# Patient Record
Sex: FEMALE | Race: WHITE
[De-identification: names, ages, dates, MRNs, and addresses within clinical notes are randomized per-mention and may not be internally consistent; named-entity substitution may affect disease eponyms.]

---

## 2017-01-04 ENCOUNTER — MESSAGE (OUTPATIENT)
Age: 32
End: 2017-01-04

## 2017-01-05 ENCOUNTER — OTHER (OUTPATIENT)
Age: 32
End: 2017-01-05

## 2017-01-06 ENCOUNTER — APPOINTMENT (OUTPATIENT)
Dept: OBGYN | Facility: CLINIC | Age: 32
End: 2017-01-06

## 2017-01-18 ENCOUNTER — APPOINTMENT (OUTPATIENT)
Dept: OBGYN | Facility: CLINIC | Age: 32
End: 2017-01-18

## 2017-02-04 ENCOUNTER — FORM ENCOUNTER (OUTPATIENT)
Age: 32
End: 2017-02-04

## 2017-02-05 ENCOUNTER — OUTPATIENT (OUTPATIENT)
Dept: OUTPATIENT SERVICES | Facility: HOSPITAL | Age: 32
LOS: 1 days | End: 2017-02-05
Payer: COMMERCIAL

## 2017-02-05 ENCOUNTER — APPOINTMENT (OUTPATIENT)
Dept: MRI IMAGING | Facility: CLINIC | Age: 32
End: 2017-02-05

## 2017-02-05 DIAGNOSIS — M54.16 RADICULOPATHY, LUMBAR REGION: ICD-10-CM

## 2017-02-05 PROCEDURE — 72148 MRI LUMBAR SPINE W/O DYE: CPT

## 2017-04-24 ENCOUNTER — TRANSCRIPTION ENCOUNTER (OUTPATIENT)
Age: 32
End: 2017-04-24

## 2017-05-10 ENCOUNTER — OTHER (OUTPATIENT)
Age: 32
End: 2017-05-10

## 2017-06-07 ENCOUNTER — APPOINTMENT (OUTPATIENT)
Dept: OBGYN | Facility: CLINIC | Age: 32
End: 2017-06-07

## 2017-06-07 DIAGNOSIS — A63.0 ANOGENITAL (VENEREAL) WARTS: ICD-10-CM

## 2017-06-08 ENCOUNTER — MOBILE ON CALL (OUTPATIENT)
Age: 32
End: 2017-06-08

## 2017-06-12 ENCOUNTER — CLINICAL ADVICE (OUTPATIENT)
Age: 32
End: 2017-06-12

## 2017-06-12 LAB
ACTINOMYCES CULTURE FOR IUD: NORMAL
CORE LAB BIOPSY: NORMAL
CYTOLOGY CVX/VAG DOC THIN PREP: NORMAL
HPV HIGH+LOW RISK DNA PNL CVX: POSITIVE

## 2017-07-03 ENCOUNTER — APPOINTMENT (OUTPATIENT)
Dept: INTERNAL MEDICINE | Facility: CLINIC | Age: 32
End: 2017-07-03

## 2017-07-21 ENCOUNTER — APPOINTMENT (OUTPATIENT)
Dept: INTERNAL MEDICINE | Facility: CLINIC | Age: 32
End: 2017-07-21

## 2017-07-21 ENCOUNTER — NON-APPOINTMENT (OUTPATIENT)
Age: 32
End: 2017-07-21

## 2017-07-21 VITALS
TEMPERATURE: 98.7 F | OXYGEN SATURATION: 98 % | HEIGHT: 66.5 IN | HEART RATE: 71 BPM | BODY MASS INDEX: 22.39 KG/M2 | WEIGHT: 141 LBS | DIASTOLIC BLOOD PRESSURE: 70 MMHG | SYSTOLIC BLOOD PRESSURE: 118 MMHG

## 2017-07-21 DIAGNOSIS — Z30.430 ENCOUNTER FOR INSERTION OF INTRAUTERINE CONTRACEPTIVE DEVICE: ICD-10-CM

## 2017-07-21 DIAGNOSIS — Z00.00 ENCOUNTER FOR GENERAL ADULT MEDICAL EXAMINATION W/OUT ABNORMAL FINDINGS: ICD-10-CM

## 2017-07-21 DIAGNOSIS — Z56.0 UNEMPLOYMENT, UNSPECIFIED: ICD-10-CM

## 2017-07-21 DIAGNOSIS — M25.512 PAIN IN LEFT SHOULDER: ICD-10-CM

## 2017-07-21 DIAGNOSIS — Z30.432 ENCOUNTER FOR REMOVAL OF INTRAUTERINE CONTRACEPTIVE DEVICE: ICD-10-CM

## 2017-07-21 RX ORDER — SERTRALINE HYDROCHLORIDE 50 MG/1
50 TABLET, FILM COATED ORAL DAILY
Qty: 45 | Refills: 3 | Status: ACTIVE | COMMUNITY

## 2017-07-21 SDOH — ECONOMIC STABILITY - INCOME SECURITY: UNEMPLOYMENT, UNSPECIFIED: Z56.0

## 2017-07-24 DIAGNOSIS — D72.829 ELEVATED WHITE BLOOD CELL COUNT, UNSPECIFIED: ICD-10-CM

## 2017-07-24 LAB
25(OH)D3 SERPL-MCNC: 28.4 NG/ML
ALBUMIN SERPL ELPH-MCNC: 4.5 G/DL
ALP BLD-CCNC: 86 U/L
ALT SERPL-CCNC: 13 U/L
ANION GAP SERPL CALC-SCNC: 16 MMOL/L
APPEARANCE: CLEAR
AST SERPL-CCNC: 18 U/L
BASOPHILS # BLD AUTO: 0.03 K/UL
BASOPHILS NFR BLD AUTO: 0.2 %
BILIRUB SERPL-MCNC: 0.3 MG/DL
BILIRUBIN URINE: NEGATIVE
BLOOD URINE: NEGATIVE
BUN SERPL-MCNC: 8 MG/DL
CALCIUM SERPL-MCNC: 10 MG/DL
CHLORIDE SERPL-SCNC: 97 MMOL/L
CHOLEST SERPL-MCNC: 230 MG/DL
CHOLEST/HDLC SERPL: 2.8 RATIO
CO2 SERPL-SCNC: 25 MMOL/L
COLOR: YELLOW
CREAT SERPL-MCNC: 0.66 MG/DL
EOSINOPHIL # BLD AUTO: 0.01 K/UL
EOSINOPHIL NFR BLD AUTO: 0.1 %
GLUCOSE QUALITATIVE U: NORMAL MG/DL
GLUCOSE SERPL-MCNC: 78 MG/DL
HBA1C MFR BLD HPLC: 5 %
HCT VFR BLD CALC: 37.3 %
HDLC SERPL-MCNC: 83 MG/DL
HGB BLD-MCNC: 12.1 G/DL
IMM GRANULOCYTES NFR BLD AUTO: 0.3 %
KETONES URINE: NEGATIVE
LDLC SERPL CALC-MCNC: 132 MG/DL
LEUKOCYTE ESTERASE URINE: NEGATIVE
LYMPHOCYTES # BLD AUTO: 1.84 K/UL
LYMPHOCYTES NFR BLD AUTO: 11.9 %
MAN DIFF?: NORMAL
MCHC RBC-ENTMCNC: 30.3 PG
MCHC RBC-ENTMCNC: 32.4 GM/DL
MCV RBC AUTO: 93.3 FL
MONOCYTES # BLD AUTO: 0.75 K/UL
MONOCYTES NFR BLD AUTO: 4.8 %
NEUTROPHILS # BLD AUTO: 12.83 K/UL
NEUTROPHILS NFR BLD AUTO: 82.7 %
NITRITE URINE: NEGATIVE
PH URINE: 6.5
PLATELET # BLD AUTO: 281 K/UL
POTASSIUM SERPL-SCNC: 4 MMOL/L
PROT SERPL-MCNC: 7.4 G/DL
PROTEIN URINE: NEGATIVE MG/DL
RBC # BLD: 4 M/UL
RBC # FLD: 13.6 %
SODIUM SERPL-SCNC: 138 MMOL/L
SPECIFIC GRAVITY URINE: 1.01
TRIGL SERPL-MCNC: 73 MG/DL
TSH SERPL-ACNC: 0.36 UIU/ML
UROBILINOGEN URINE: NORMAL MG/DL
WBC # FLD AUTO: 15.51 K/UL

## 2017-08-15 ENCOUNTER — APPOINTMENT (OUTPATIENT)
Dept: OBGYN | Facility: CLINIC | Age: 32
End: 2017-08-15
Payer: COMMERCIAL

## 2017-08-15 ENCOUNTER — ASOB RESULT (OUTPATIENT)
Age: 32
End: 2017-08-15

## 2017-08-15 ENCOUNTER — CLINICAL ADVICE (OUTPATIENT)
Age: 32
End: 2017-08-15

## 2017-08-15 PROCEDURE — 76830 TRANSVAGINAL US NON-OB: CPT

## 2017-08-29 ENCOUNTER — OTHER (OUTPATIENT)
Age: 32
End: 2017-08-29

## 2017-09-18 ENCOUNTER — APPOINTMENT (OUTPATIENT)
Dept: OBGYN | Facility: CLINIC | Age: 32
End: 2017-09-18
Payer: COMMERCIAL

## 2017-09-18 VITALS
HEIGHT: 67 IN | WEIGHT: 145 LBS | SYSTOLIC BLOOD PRESSURE: 110 MMHG | BODY MASS INDEX: 22.76 KG/M2 | DIASTOLIC BLOOD PRESSURE: 76 MMHG

## 2017-09-18 DIAGNOSIS — N93.0 POSTCOITAL AND CONTACT BLEEDING: ICD-10-CM

## 2017-09-18 PROCEDURE — 99212 OFFICE O/P EST SF 10 MIN: CPT

## 2017-09-19 ENCOUNTER — MESSAGE (OUTPATIENT)
Age: 32
End: 2017-09-19

## 2017-09-19 LAB
C TRACH RRNA SPEC QL NAA+PROBE: NORMAL
CANDIDA VAG CYTO: NOT DETECTED
G VAGINALIS+PREV SP MTYP VAG QL MICRO: NOT DETECTED
HPV HIGH+LOW RISK DNA PNL CVX: NEGATIVE
N GONORRHOEA RRNA SPEC QL NAA+PROBE: NORMAL
SOURCE AMPLIFICATION: NORMAL
T VAGINALIS VAG QL WET PREP: NOT DETECTED

## 2017-09-23 ENCOUNTER — MOBILE ON CALL (OUTPATIENT)
Age: 32
End: 2017-09-23

## 2017-09-24 ENCOUNTER — MESSAGE (OUTPATIENT)
Age: 32
End: 2017-09-24

## 2017-09-24 LAB — CYTOLOGY CVX/VAG DOC THIN PREP: NORMAL

## 2017-09-25 ENCOUNTER — CLINICAL ADVICE (OUTPATIENT)
Age: 32
End: 2017-09-25

## 2017-10-13 ENCOUNTER — APPOINTMENT (OUTPATIENT)
Dept: OBGYN | Facility: CLINIC | Age: 32
End: 2017-10-13

## 2017-10-18 ENCOUNTER — APPOINTMENT (OUTPATIENT)
Dept: OBGYN | Facility: CLINIC | Age: 32
End: 2017-10-18
Payer: COMMERCIAL

## 2017-10-18 ENCOUNTER — ASOB RESULT (OUTPATIENT)
Age: 32
End: 2017-10-18

## 2017-10-18 PROCEDURE — 76830 TRANSVAGINAL US NON-OB: CPT

## 2017-10-19 ENCOUNTER — TRANSCRIPTION ENCOUNTER (OUTPATIENT)
Age: 32
End: 2017-10-19

## 2017-11-01 ENCOUNTER — APPOINTMENT (OUTPATIENT)
Dept: OBGYN | Facility: CLINIC | Age: 32
End: 2017-11-01

## 2017-11-13 ENCOUNTER — TRANSCRIPTION ENCOUNTER (OUTPATIENT)
Age: 32
End: 2017-11-13

## 2017-11-14 ENCOUNTER — RESULT REVIEW (OUTPATIENT)
Age: 32
End: 2017-11-14

## 2017-12-14 ENCOUNTER — APPOINTMENT (OUTPATIENT)
Dept: INTERNAL MEDICINE | Facility: CLINIC | Age: 32
End: 2017-12-14
Payer: COMMERCIAL

## 2017-12-14 VITALS
TEMPERATURE: 98.6 F | SYSTOLIC BLOOD PRESSURE: 116 MMHG | BODY MASS INDEX: 22.6 KG/M2 | OXYGEN SATURATION: 98 % | HEART RATE: 96 BPM | HEIGHT: 67 IN | DIASTOLIC BLOOD PRESSURE: 76 MMHG | WEIGHT: 144 LBS

## 2017-12-14 DIAGNOSIS — M54.16 RADICULOPATHY, LUMBAR REGION: ICD-10-CM

## 2017-12-14 DIAGNOSIS — Z86.69 PERSONAL HISTORY OF OTHER DISEASES OF THE NERVOUS SYSTEM AND SENSE ORGANS: ICD-10-CM

## 2017-12-14 DIAGNOSIS — F41.8 OTHER SPECIFIED ANXIETY DISORDERS: ICD-10-CM

## 2017-12-14 DIAGNOSIS — Z83.3 FAMILY HISTORY OF DIABETES MELLITUS: ICD-10-CM

## 2017-12-14 DIAGNOSIS — Z87.42 PERSONAL HISTORY OF OTHER DISEASES OF THE FEMALE GENITAL TRACT: ICD-10-CM

## 2017-12-14 DIAGNOSIS — Z82.61 FAMILY HISTORY OF ARTHRITIS: ICD-10-CM

## 2017-12-14 DIAGNOSIS — Z86.59 PERSONAL HISTORY OF OTHER MENTAL AND BEHAVIORAL DISORDERS: ICD-10-CM

## 2017-12-14 DIAGNOSIS — Z81.8 FAMILY HISTORY OF OTHER MENTAL AND BEHAVIORAL DISORDERS: ICD-10-CM

## 2017-12-14 DIAGNOSIS — Z87.898 PERSONAL HISTORY OF OTHER SPECIFIED CONDITIONS: ICD-10-CM

## 2017-12-14 DIAGNOSIS — Z82.62 FAMILY HISTORY OF OSTEOPOROSIS: ICD-10-CM

## 2017-12-14 DIAGNOSIS — M54.9 DORSALGIA, UNSPECIFIED: ICD-10-CM

## 2017-12-14 DIAGNOSIS — Z78.9 OTHER SPECIFIED HEALTH STATUS: ICD-10-CM

## 2017-12-14 DIAGNOSIS — Z80.3 FAMILY HISTORY OF MALIGNANT NEOPLASM OF BREAST: ICD-10-CM

## 2017-12-14 PROCEDURE — 99214 OFFICE O/P EST MOD 30 MIN: CPT

## 2017-12-14 RX ORDER — GABAPENTIN 100 MG/1
100 CAPSULE ORAL
Qty: 90 | Refills: 0 | Status: ACTIVE | COMMUNITY
Start: 2017-12-14

## 2018-01-20 ENCOUNTER — TRANSCRIPTION ENCOUNTER (OUTPATIENT)
Age: 33
End: 2018-01-20

## 2018-01-23 ENCOUNTER — APPOINTMENT (OUTPATIENT)
Age: 33
End: 2018-01-23
Payer: COMMERCIAL

## 2018-01-23 ENCOUNTER — RESULT CHARGE (OUTPATIENT)
Age: 33
End: 2018-01-23

## 2018-01-23 VITALS
HEART RATE: 87 BPM | OXYGEN SATURATION: 97 % | DIASTOLIC BLOOD PRESSURE: 70 MMHG | BODY MASS INDEX: 22.6 KG/M2 | WEIGHT: 144 LBS | TEMPERATURE: 99.2 F | HEIGHT: 67 IN | SYSTOLIC BLOOD PRESSURE: 110 MMHG

## 2018-01-23 DIAGNOSIS — J06.9 ACUTE UPPER RESPIRATORY INFECTION, UNSPECIFIED: ICD-10-CM

## 2018-01-23 PROCEDURE — 87804 INFLUENZA ASSAY W/OPTIC: CPT | Mod: QW

## 2018-01-23 PROCEDURE — 99213 OFFICE O/P EST LOW 20 MIN: CPT | Mod: 25

## 2018-01-23 PROCEDURE — 87880 STREP A ASSAY W/OPTIC: CPT | Mod: QW

## 2018-01-24 ENCOUNTER — RESULT CHARGE (OUTPATIENT)
Age: 33
End: 2018-01-24

## 2018-01-24 LAB — RAPID RVP RESULT: NOT DETECTED

## 2018-01-29 LAB — BACTERIA THROAT CULT: NORMAL

## 2018-05-11 ENCOUNTER — RESULT REVIEW (OUTPATIENT)
Age: 33
End: 2018-05-11

## 2018-08-07 ENCOUNTER — HOSPITAL ENCOUNTER (OUTPATIENT)
Dept: HOSPITAL 14 - H.OPSURG | Age: 33
Setting detail: OBSERVATION
LOS: 1 days | Discharge: HOME | End: 2018-08-08
Attending: OBSTETRICS & GYNECOLOGY | Admitting: OBSTETRICS & GYNECOLOGY
Payer: COMMERCIAL

## 2018-08-07 VITALS — BODY MASS INDEX: 21.9 KG/M2

## 2018-08-07 DIAGNOSIS — N93.9: ICD-10-CM

## 2018-08-07 DIAGNOSIS — N80.3: ICD-10-CM

## 2018-08-07 DIAGNOSIS — N13.4: ICD-10-CM

## 2018-08-07 DIAGNOSIS — N80.4: ICD-10-CM

## 2018-08-07 DIAGNOSIS — N13.5: ICD-10-CM

## 2018-08-07 DIAGNOSIS — N73.6: ICD-10-CM

## 2018-08-07 DIAGNOSIS — N80.8: ICD-10-CM

## 2018-08-07 DIAGNOSIS — N80.1: Primary | ICD-10-CM

## 2018-08-07 DIAGNOSIS — Q51.2: ICD-10-CM

## 2018-08-07 DIAGNOSIS — N80.5: ICD-10-CM

## 2018-08-07 DIAGNOSIS — N80.0: ICD-10-CM

## 2018-08-07 DIAGNOSIS — N70.11: ICD-10-CM

## 2018-08-07 LAB
BASOPHILS # BLD AUTO: 0.1 K/UL (ref 0–0.2)
BASOPHILS NFR BLD: 0.7 % (ref 0–2)
EOSINOPHIL # BLD AUTO: 0.1 K/UL (ref 0–0.7)
EOSINOPHIL NFR BLD: 1.6 % (ref 0–4)
ERYTHROCYTE [DISTWIDTH] IN BLOOD BY AUTOMATED COUNT: 13.4 % (ref 11.5–14.5)
HGB BLD-MCNC: 12.4 G/DL (ref 12–16)
LYMPHOCYTES # BLD AUTO: 1.9 K/UL (ref 1–4.3)
LYMPHOCYTES NFR BLD AUTO: 22.8 % (ref 20–40)
MCH RBC QN AUTO: 30.7 PG (ref 27–31)
MCHC RBC AUTO-ENTMCNC: 34.3 G/DL (ref 33–37)
MCV RBC AUTO: 89.6 FL (ref 81–99)
MONOCYTES # BLD: 0.5 K/UL (ref 0–0.8)
MONOCYTES NFR BLD: 6.6 % (ref 0–10)
NEUTROPHILS # BLD: 5.6 K/UL (ref 1.8–7)
NEUTROPHILS NFR BLD AUTO: 68.3 % (ref 50–75)
NRBC BLD AUTO-RTO: 0.1 % (ref 0–0)
PLATELET # BLD: 232 K/UL (ref 130–400)
PMV BLD AUTO: 8.6 FL (ref 7.2–11.7)
RBC # BLD AUTO: 4.04 MIL/UL (ref 3.8–5.2)
WBC # BLD AUTO: 8.3 K/UL (ref 4.8–10.8)

## 2018-08-07 PROCEDURE — 53899 UNLISTED PX URINARY SYSTEM: CPT

## 2018-08-07 PROCEDURE — 88305 TISSUE EXAM BY PATHOLOGIST: CPT

## 2018-08-07 PROCEDURE — 85025 COMPLETE CBC W/AUTO DIFF WBC: CPT

## 2018-08-07 PROCEDURE — 58560 HYSTEROSCOPY RESECT SEPTUM: CPT

## 2018-08-07 PROCEDURE — 88304 TISSUE EXAM BY PATHOLOGIST: CPT

## 2018-08-07 PROCEDURE — 36415 COLL VENOUS BLD VENIPUNCTURE: CPT

## 2018-08-07 PROCEDURE — 86900 BLOOD TYPING SEROLOGIC ABO: CPT

## 2018-08-07 PROCEDURE — 49203: CPT

## 2018-08-07 PROCEDURE — 86850 RBC ANTIBODY SCREEN: CPT

## 2018-08-07 PROCEDURE — 58999 UNLISTED PX FML GENITAL SYS: CPT

## 2018-08-07 PROCEDURE — 44111 EXCISION OF BOWEL LESION(S): CPT

## 2018-08-07 PROCEDURE — 44955 APPENDECTOMY ADD-ON: CPT

## 2018-08-07 PROCEDURE — 45171 EXC RECT TUM TRANSANAL PART: CPT

## 2018-08-07 RX ADMIN — HYDROMORPHONE HYDROCHLORIDE PRN MG: 1 INJECTION, SOLUTION INTRAMUSCULAR; INTRAVENOUS; SUBCUTANEOUS at 15:20

## 2018-08-07 RX ADMIN — HYDROMORPHONE HYDROCHLORIDE PRN MG: 1 INJECTION, SOLUTION INTRAMUSCULAR; INTRAVENOUS; SUBCUTANEOUS at 15:35

## 2018-08-07 RX ADMIN — Medication PRN MG: at 19:11

## 2018-08-07 NOTE — PCM.SURG1
Surgeon's Initial Post Op Note





- Surgeon's Notes


Surgeon: Amaris


Assistant: Burgess Roman MD PGY4


Type of Anesthesia: General Endo, Local


Pre-Operative Diagnosis: Endometriosis


Operative Findings: see op note


Post-Operative Diagnosis: Severe endometriosis


Operation Performed: Robotic assisted endometrectomy; removal of endometrioma, 

appendectomy


Specimen/Specimens Removed: see op note, multiple specimens


Estimated Blood Loss: EBL {In ML}: 15


Blood Products Given: N/A


Drains Used: No Drains


Post-Op Condition: Good


Date of Surgery/Procedure: 08/07/18


Time of Surgery/Procedure: 12:00

## 2018-08-08 VITALS — OXYGEN SATURATION: 99 % | TEMPERATURE: 98.2 F

## 2018-08-08 VITALS — SYSTOLIC BLOOD PRESSURE: 105 MMHG | HEART RATE: 78 BPM | DIASTOLIC BLOOD PRESSURE: 64 MMHG | RESPIRATION RATE: 18 BRPM

## 2018-08-08 RX ADMIN — Medication PRN MG: at 03:07

## 2018-08-08 NOTE — CP.PCM.PN
Subjective





- Date & Time of Evaluation


Date of Evaluation: 08/08/18


Time of Evaluation: 10:20





- Subjective


Subjective: 


Gyn Surgery


Pt seen and examined. Had one episode of emesis last night but now tolerating 

minimal liquids. Reports feeling weak from not eating but does not have much 

appetite. Voiding, Ambulating, no flatus/BM. No other complaints





Objective





- Vital Signs/Intake and Output


Vital Signs (last 24 hours): 


 











Temp Pulse Resp BP Pulse Ox


 


 98.2 F   79   16   102/64   99 


 


 08/08/18 08:32  08/08/18 08:32  08/08/18 08:32  08/08/18 08:32  08/08/18 08:32








Intake and Output: 


 











 08/08/18 08/08/18





 06:59 18:59


 


Intake Total 2000 


 


Balance 2000 














- Medications


Medications: 


 Current Medications





Acetaminophen (Tylenol 325mg Tab)  650 mg PO Q6 [facility]


   Last Admin: 08/07/18 22:09 Dose:  650 mg


Bisacodyl (Dulcolax)  5 mg PO ONCE ONE


   Stop: 08/08/18 10:48


Docusate Sodium (Colace)  100 mg PO BID [facility]


Lactated Ringer's (Lactated Ringer's)  1,000 mls @ 125 mls/hr IV .Q8H [facility]


   Last Admin: 08/07/18 21:03 Dose:  125 mls/hr


Ibuprofen (Motrin Tab)  600 mg PO Q8 PRN


   PRN Reason: Pain, Mild (1-3)


   Last Admin: 08/08/18 02:07 Dose:  600 mg


Ondansetron HCl (Zofran Inj)  4 mg IVP Q6 PRN


   PRN Reason: Nausea/Vomiting


   Last Admin: 08/07/18 22:04 Dose:  4 mg


Oxycodone HCl (Oxycodone Immediate Release Tab)  5 mg PO Q6 PRN


   PRN Reason: Pain, moderate (4-7)


   Last Admin: 08/08/18 03:07 Dose:  5 mg











- Labs


Labs: 


 





 08/07/18 09:31 











- Constitutional


Appears: Non-toxic, No Acute Distress





- Head Exam


Head Exam: ATRAUMATIC, NORMOCEPHALIC





- Eye Exam


Eye Exam: EOMI.  absent: Scleral icterus





- Respiratory Exam


Respiratory Exam: NORMAL BREATHING PATTERN.  absent: Respiratory Distress





- Cardiovascular Exam


Cardiovascular Exam: RRR, +S1, +S2





- GI/Abdominal Exam


GI & Abdominal Exam: Soft, Tenderness (minimal at incisions).  absent: Distended

, Firm, Guarding, Rigid, Rebound


Additional comments: 


dressings c/d/i





- Extremities Exam


Extremities Exam: absent: Calf Tenderness, Pedal Edema





- Neurological Exam


Neurological Exam: Alert, Awake, Oriented x3





- Skin


Skin Exam: Dry, Warm





Assessment and Plan





- Assessment and Plan (Free Text)


Assessment: 


33F s/p  Robotic assisted endometrectomy; removal of endometrioma, appendectomy 

POD#1





Plan: 


Diaz out this AM


Advanced to Regular diet


Stool softener


Possible DC later today if doing well.


D/W Dr. Amaris Schwarz PGY4

## 2018-08-09 NOTE — PCM.OP
Operative Report





- Operative Report


Date of Surgery/Procedure: 08/07/18


Time of Surgery/Procedure: 12:00


Surgeon: Dr. Moreno Roman


Assistant: Dr. Lazaro Glez


Anesthesia/Sedation: general/Dr. Aldana


Pre-Operative Diagnosis: abdominal pina and endometriosis


Post-Operative Diagnosis: multiple areas of sigmid colon invlvement and appendix


Indication for Surgery: as above


Operative Findings: as above


Procedure/Operation Description: 1-Excision perirectal and sigmoid 

endometriosis (perirectal x1, sigmoid x2).  2-Appendectomy.  Brief History: 

This 33 year old woman with severe abdominal pain from endometriosis was 

already brought to the operating room by Dr. Taylor when he requested an 

intraoperatie general surfgery consultation.  Description of the Procedure: The 

robotic procedure was already initiated by Dr. Glez (separate dictation). 

After beltran control of the robotic console the emilee lesion in the sigm oid 

colon was incised circumferentially and with blunt and sharp dissection with 

the aid of electrocautery the lesion was excided en-bloc, mared and sent to 

pathology separately. The second sigmoid lesion was excised in a similar 

fashion. Both defects were closed with interrupted 3-0 vicryl. The rectal 

lesion was excised in a similar fashion and sent separately to pathology. The 

appendix was retracted anteriorly and the mesentery was dessicated with 

monopolar to the base. With three separate 3-0 vicryl endoloops the appendix ws 

ligated, transected, mared and sent to pathology separately. The operation was 

then turned ocer to Dr. Merida ()separate dictation Dr. Glez).


Estimated Blood Loss: 10 cc


Complications: none


Specimen: sigmoid colon (times two) and rectum


Discharge & Condition: stable

## 2018-08-13 NOTE — OP
PROCEDURE DATE:  08/07/2018



SURGEON: Lazaro Glez MD



ASSISTANT: 1. Moreno Roman MD 2. Jorge Schwarz DO



ANESTHESIOLOGIST: Amada Aldana MD



ANESTHETIC: General Endo



PREOPERATIVE DIAGNOSES:

1.  Incapacitating pelvic pain.

2.  Incapacitating abdominal pain.

3.  Abnormal uterine bleeding.

4.  History of pelvic endometriosis.

5.  History of previous failed medical surgical therapy.

6.  History of severe endometriosis and pelvic adhesions.

7.  Gastrointestinal and genitourinary symptoms.

8.  Rule out interstitial cystitis.

9.  Adenomyosis.



POSTOPERATIVE DIAGNOSES:

1.  Incapacitating pelvic pain.

2.  Incapacitating abdominal pain.

3.  Abnormal uterine bleeding.

4.  History of pelvic endometriosis.

5.  History of previous failed medical surgical therapy.

6.  History of severe endometriosis and pelvic adhesions.

7.  Gastrointestinal and genitourinary symptoms.

8.  Rule out interstitial cystitis.

9.  Adenomyosis.

10.  Severe pelvic endometriosis.

11.  Ovarian adhesions.

12.  Bowel adhesions.

13.  Subseptate uterus.

14.  Mild bilateral hydroureters.



OPERATION PERFORMED:

1.  Examination under anesthesia.

2.  Video_assisted hysteroscopy.

3.  Hysteroscopic septoplasty.

4.  Robotic da Faisal laparoscopy.

5.  Enterolysis.

6.  Bilateral ureterolysis.

7.  Bilateral salpingo_ovariolytis.

8.  Multiple peritoneal biopsies and excision of endometriosis.

9.  Treatment of endometriosis.

10.  Shaving of endometriosis of the bowel.

11.  Cystoscopy.

12.  Bilateral ureteral catheterization and injection of IC-Green dye.



Dr. Roman from General Surgery was consulted to perform 1-Excision perirectal 
and sigmoid endometriosis (perirectal x1, sigmoid x2). 

2-Appendectomy procedure and he will dictate that separately.



COMPLICATIONS:  None.



SAMPLES:  



DRAINS:  



ESTIMATED BLOOD LOSS:  Minimal.



HISTORY: MD PROVIDES HISTORY



FINDINGS:  



Genitalia:  normal, external genitalia, cervix normal without lesions or 
polyps.  



Hysteroscopy showed evidence of a small septum of the fundus of the uterus and 
no other lesions visualized.  



Cystoscopy was performed to rule out endometriosis of bladder mucosa and also 
interstitial cystitis, also  injury.  The bladder was normal with no evidence 
of stone, trigonitis or cystitis.  Positive jet flow visualized in both 
ureters.  



Laparoscopy was normal, gallbladder was normal, liver edges appeared to be 
normal.  Ascending colon and transverse were normal.  The appendix appeared to 
be abnormal with both fibrosis and thickening.  There was evidence of severe 
adhesions, fibrosis and endometriosis of the rectovaginal septum and attachment 
of the bowel to the posterior aspect of the uterus and to both the right and 
left adnexa.  Both ovaries were severely attached to the posterior aspect of 
the ureters with endometriosis and adhesions.  Fallopian tubes appeared to have 
some inflammatory changes of adhesions, but overall appeared to be normal.  
Both ovaries appeared to be involved with scar.  There was also evidence of 
endometriosis of the rectovaginal septum in right and left perirectal areas and 
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  There 
was also evidence of severe retroperitoneal fibrosis in this area.  There was 
also evidence of mild bilateral hydroureters.



CONSENT:  The patient had been thoroughly evaluated and counseled regarding 
pros and cons of the procedure, the reasonable alternative, and the possible 
complications.  She understood and accepted the risks involved.  Appropriate 
literature was provided to the patient.  The patient was in understanding that 
given her history and presurgical exam, she knows that she was a high risk and 
average patient.  She accepted all the risks involved and all the questions had 
been answered to her satisfaction.



DESCRIPTION OF PROCEDURE:  



Initiation of the case:  After adequate anesthesia was obtained, the patient 
was placed in the dorsal lithotomy position with extreme care of placement of 
the patient without hyperextension or hyperflexing the hips.  At this point, 
the patient was prepped and draped, the surgeon was gowned and gloved.  A time-
out was taken according to the hospital procedure and the procedure was 
started.  At this point, we performed the cystoscopy and bilateral ureteral 
catheterization. 



At this point we performed cystoscopy:

A cystoscope was inserted into the bladder, under direct visualization and the 
bladder was visualized.  The bladder was free of lesions, tumors.  There was no 
evidence of interstitial cystitis, and there was only a mild amount of 
trigonitis.  At this point, both ureters were identified and appeared to be in 
normal anatomical position.  At this point, utilizing an open-ended 5-Anguillan 
catheter, the left ureter was catheterized all the way to the distal ureter, 
and a 5 mL of IC-Green were injected into the distal ureter.  Similarly, on the 
contralateral ureter, the ureter was catheterized all the way to the distal 
ureter, and a 5 mL of IC-Green were injected into the distal ureter.  At this 
point, the stents were removed, and the hysteroscope was removed and a 16-
Anguillan Diaz was placed into the bladder.  



At this point, we proceeded with a hysteroscopy:

A speculum was placed in the vagina, and the anterior lip of the cervix was 
grasped. The cervix was dilated and a hysteroscope was inserted into the 
cavity. The cavity appeared to be of normal size, but there was evidence of a 
subseptum at the top of the uterus.  At this point, we proceeded with excision 
of uterine septum.  Once the septum was identified, the endoscopic scissors 
were inserted into the uterine cavity and a progressive dissection of the 
septum was performed with great care not to perforate the uterus and not to 
cause any bleeding.  The procedure was basically bloodless and the septum was 
excised.  



At this point, we proceeded with placement of trocars and docking of the Da 
Faisal Xi robot

 The surgeons were re-gowned and re-gloved, and an open laparoscopy was 
performed by making an incision below the umbilicus, and the fascia was incised
, and the peritoneum was entered in the blunt fashion.  The cannula was 
inserted and the abdomen was insufflated, and under direct visualization 3 
additional ports were inserted, left upper quadrant, left mid quadrant and 
right upper quadrant.  At this point, the da Faisal Xi robot was brought into 
the field and docked, and the instruments were inserted under direct 
visualization.  With extreme care not to injure the bowel or any other area.  
As per the dictation, the upper abdomen appeared to be normal with no evidence 
of any lesions.  The pelvis had the findings described above, which included 
significant adhesions, fibrosis of the posterior cul-de-sac, significant 
endometriosis with deep endometriosis nodules.  Both ovary adherent to both the 
ovarian fossas and the posterior aspect of the uterus with significant 
inflammatory changes.  



At this point, we proceeded with the left ureterolysis.  

The ureter appeared to dilated and it was clearly identified utilizing 
fluorescent technology.  An incision was made on the peritoneum at the top of 
the pelvic brim, and incision was then carried down all the way opening the 
peritoneum and all the way down from the pelvic brim all the way down to the 
ovarian fossa extending the incision below the ovary.  It was a progressive 
dissection where the ureter was progressively lateralized and the peritoneum 
medialized, thus freeing the ureter all the way down to the crossing of the 
uterine vessels.  After this was done and the ureter was freed and lateralized 
and a large area of peritoneum, which had been opened up was excised and sent 
to pathology.  At this point, after ureter had been identified, we were able to 
elevate the ovary and dissect it from the pelvic side wall in the ovarian fossa



At this point, we proceeded with the left ovariolysis.  

The ovary was gently dissected and elevated off the ovarian fossa and area of 
fibrosis of endometriosis were exposed.  



At this point, we proceeded with the right ureterolysis.  

The ureter was identified and again utilizing florescent technology, the 
retroperitoneal space was entered and a full dissection was performed entering 
the retroperitoneal space and dissecting the ureter, removing the ureter 
laterally and the peritoneum medially.  A full dissection was performed all the 
way down to the ovarian fossa, on the crossing of the uterine arteries.  A 
large area of peritoneum containing endometriosis was also dissected and sent 
to Pathology. 



 At this point, we proceeded with a right ovariolysis.  

The ovary was progressively elevated, areas of deep endometriosis and 
superficial endometriosis were dissected out and the ovary was finally 
elevated.  



At this point, we proceeded with a treatment of endometriosis and excision of 
endometriosis.  

On the left hand side, a large area of peritoneum, where containing 
endometriosis was excised in the ovarian fossa with the upper margin of the 
excision at the utero_ovarian ligament all the way down to the uterosacral 
ligament.  Large areas of fibrosis were identified in posterior cul-de-sac and 
the rectovaginal space was affected with endometriosis and severe fibrosis.  
The rectovaginal area was then dissected and the space was opened, and we were 
able to dissect the rectum away from the posterior aspect of the cervix.  
Additional areas of endometriosis were dissected from the posterior aspect of 
the Uterus.  At this point, we proceeded with excision of the endometriosis on 
the right hand side where similarly in a full excision of endometriosis was 
performed by performing incision from starting at the right utero_ovarian 
ligament all the way down to the right uterosacral ligament.  At this point, 
Dr. Roman from General Surgery was  called in and he performed _____________
procedure, which she will dictate separately.  At this point, it was checked 
for hemostasis and appeared to be excellent.  All the endometriosis had been 
excised.  At this point, we performed the ablation of inflamed peritoneum, 
utilizing the J_plasma device.  There were inflammatory areas in the posterior 
aspect of the uterus, which were not endometriotic, but they were purely 
inflammatory and these were not excisable, as they were not endometriotic.  
Therefore, we proceeded with ablation of such area utilizing the J plasma.  
Once this was done, it was checked for hemostasis and appeared to be excellent.
  The consult was handed to Dr. Roman, who performed the ___________ and he 
will dictate that separately.  After this was done, it was checked for 
hemostasis and appeared to be excellent.  The pelvis was irrigated.  The da 
Faisal Xi robot was removed.  The abdomen desufflated.  The instruments were 
removed.  The incisions were closed in layers with 0 PDS for the fascia and 4-0 
Monocryl for the skin.  The patient was awakened up and taken to recovery room 
in excellent condition.





______________

Lazaro Glez MD
[facility] SystemLASHON

## 2018-09-06 ENCOUNTER — APPOINTMENT (OUTPATIENT)
Dept: INTERNAL MEDICINE | Facility: CLINIC | Age: 33
End: 2018-09-06

## 2018-10-10 ENCOUNTER — TRANSCRIPTION ENCOUNTER (OUTPATIENT)
Age: 33
End: 2018-10-10

## 2019-01-21 ENCOUNTER — RESULT REVIEW (OUTPATIENT)
Age: 34
End: 2019-01-21

## 2019-03-21 ENCOUNTER — OUTPATIENT (OUTPATIENT)
Dept: OUTPATIENT SERVICES | Facility: HOSPITAL | Age: 34
LOS: 1 days | End: 2019-03-21
Payer: COMMERCIAL

## 2019-03-21 ENCOUNTER — APPOINTMENT (OUTPATIENT)
Dept: RADIOLOGY | Facility: IMAGING CENTER | Age: 34
End: 2019-03-21
Payer: COMMERCIAL

## 2019-03-21 DIAGNOSIS — Z00.8 ENCOUNTER FOR OTHER GENERAL EXAMINATION: ICD-10-CM

## 2019-03-21 PROCEDURE — 72100 X-RAY EXAM L-S SPINE 2/3 VWS: CPT | Mod: 26

## 2019-03-21 PROCEDURE — 72100 X-RAY EXAM L-S SPINE 2/3 VWS: CPT

## 2019-09-20 ENCOUNTER — TRANSCRIPTION ENCOUNTER (OUTPATIENT)
Age: 34
End: 2019-09-20

## 2020-01-14 ENCOUNTER — RESULT REVIEW (OUTPATIENT)
Age: 35
End: 2020-01-14

## 2020-12-16 PROBLEM — J06.9 URI, ACUTE: Status: RESOLVED | Noted: 2018-01-23 | Resolved: 2020-12-16
